# Patient Record
(demographics unavailable — no encounter records)

---

## 2024-10-31 NOTE — DISCUSSION/SUMMARY
[FreeTextEntry1] : Counseled fully. *Prolonged counseling provided. PREWORK: Reviewed prior notes, reports, and results. Independent historian; parent.  Patient presents with parent for sick visit c/o painful urination. *Pt/ Mom report BMs not everyday. Pt described "spasms" even outside of urinations.  UA in office shows trace Leuks. Will send UA culture. Will f/u results in 48 hours. Rec sitz bath with vinegar BID x48 hours. Advised to increase hydration daily.  Recommend increased dietary fiber and probiotic. Encouraged hydration.  Advised trial of Miralax. 1 cap full QD x 1 week, then every other day for the following week.  Return if symptoms worsen or persist.

## 2024-10-31 NOTE — HISTORY OF PRESENT ILLNESS
[FreeTextEntry6] : POSSIBLE UTI PT SAYS IT HURTS TO URINATE NOT BURNING / MORE OF PAIN FEELING / CRAMP

## 2025-01-09 NOTE — HISTORY OF PRESENT ILLNESS
[Mother] : mother [___ stools every other day] : [unfilled]  stools every other day [Normal] : Normal [Brushing teeth twice/d] : brushing teeth twice per day [Yes] : Patient goes to dentist yearly [Toothpaste] : Primary Fluoride Source: Toothpaste [Adequate performance] : adequate performance [No] : No cigarette smoke exposure [Up to date] : Up to date [de-identified] : Eating well overall. [FreeTextEntry8] :  Rec probiotics for regularity. [de-identified] : Anticipatory Guidance Provided [de-identified] : Hep A #2 today [FreeTextEntry1] : PT HERE FOR 9 YR [place] - HEP A #2 DOING WELL OVERALL   OAE- OOS  VISION- UNDER CARE UA- NORMAL

## 2025-01-09 NOTE — HISTORY OF PRESENT ILLNESS
[Mother] : mother [___ stools every other day] : [unfilled]  stools every other day [Normal] : Normal [Brushing teeth twice/d] : brushing teeth twice per day [Yes] : Patient goes to dentist yearly [Toothpaste] : Primary Fluoride Source: Toothpaste [Adequate performance] : adequate performance [No] : No cigarette smoke exposure [Up to date] : Up to date [de-identified] : Eating well overall. [FreeTextEntry8] :  Rec probiotics for regularity. [de-identified] : Anticipatory Guidance Provided [de-identified] : Hep A #2 today [FreeTextEntry1] : PT HERE FOR 9 YR [place] - HEP A #2 DOING WELL OVERALL   OAE- OOS  VISION- UNDER CARE UA- NORMAL

## 2025-01-09 NOTE — DISCUSSION/SUMMARY
[Normal Growth] : growth [Normal Development] : development [None] : No known medical problems [No Elimination Concerns] : elimination [No Feeding Concerns] : feeding [No Skin Concerns] : skin [Normal Sleep Pattern] : sleep [No Medications] : ~He/She~ is not on any medications [Parent/Guardian] : parent/guardian [Full Activity without restrictions including Physical Education & Athletics] : Full Activity without restrictions including Physical Education & Athletics [] : The components of the vaccine(s) to be administered today are listed in the plan of care. The disease(s) for which the vaccine(s) are intended to prevent and the risks have been discussed with the caretaker.  The risks are also included in the appropriate vaccination information statements which have been provided to the patient's caregiver.  The caregiver has given consent to vaccinate. [FreeTextEntry9] : Anticipatory Guidance Provided [FreeTextEntry1] : Counseled fully. PREWORK: Reviewed prior notes, reports, and results. Independent historian; parent.  RBW UTD 2022.  Continue balanced diet with all food groups. Brush teeth twice a day with toothbrush. Recommend visit to dentist. Help child to maintain consistent daily routines and sleep schedule. School discussed. Ensure home is safe. Teach child about personal safety. Use consistent, positive discipline. Limit screen time to no more than 2 hours per day. Encourage physical activity. Child needs to ride in a belt-positioning booster seat until  4 feet 9 inches has been reached and are between 8 and 12 years of age.   Return 1 year for routine well child check.